# Patient Record
Sex: FEMALE | Race: BLACK OR AFRICAN AMERICAN | Employment: FULL TIME | URBAN - METROPOLITAN AREA
[De-identification: names, ages, dates, MRNs, and addresses within clinical notes are randomized per-mention and may not be internally consistent; named-entity substitution may affect disease eponyms.]

---

## 2020-11-09 ENCOUNTER — OFFICE VISIT (OUTPATIENT)
Dept: ENDOCRINOLOGY | Age: 55
End: 2020-11-09
Payer: COMMERCIAL

## 2020-11-09 VITALS
SYSTOLIC BLOOD PRESSURE: 131 MMHG | RESPIRATION RATE: 16 BRPM | OXYGEN SATURATION: 98 % | TEMPERATURE: 97.2 F | DIASTOLIC BLOOD PRESSURE: 76 MMHG | HEIGHT: 67 IN | HEART RATE: 79 BPM | BODY MASS INDEX: 31.55 KG/M2 | WEIGHT: 201 LBS

## 2020-11-09 DIAGNOSIS — E55.9 VITAMIN D DEFICIENCY: ICD-10-CM

## 2020-11-09 DIAGNOSIS — E83.52 HYPERCALCEMIA: Primary | ICD-10-CM

## 2020-11-09 LAB
25(OH)D3 SERPL-MCNC: 14.4 NG/ML (ref 30–100)
ALBUMIN SERPL-MCNC: 4 G/DL (ref 3.5–5)
ALBUMIN/GLOB SERPL: 1.2 {RATIO} (ref 1.1–2.2)
ALP SERPL-CCNC: 58 U/L (ref 45–117)
ALT SERPL-CCNC: 31 U/L (ref 12–78)
ANION GAP SERPL CALC-SCNC: 7 MMOL/L (ref 5–15)
AST SERPL-CCNC: 14 U/L (ref 15–37)
BILIRUB SERPL-MCNC: 0.4 MG/DL (ref 0.2–1)
BUN SERPL-MCNC: 10 MG/DL (ref 6–20)
BUN/CREAT SERPL: 14 (ref 12–20)
CALCIUM SERPL-MCNC: 10.1 MG/DL (ref 8.5–10.1)
CALCIUM SERPL-MCNC: 10.2 MG/DL (ref 8.5–10.1)
CHLORIDE SERPL-SCNC: 104 MMOL/L (ref 97–108)
CO2 SERPL-SCNC: 27 MMOL/L (ref 21–32)
CREAT SERPL-MCNC: 0.74 MG/DL (ref 0.55–1.02)
GLOBULIN SER CALC-MCNC: 3.3 G/DL (ref 2–4)
GLUCOSE SERPL-MCNC: 124 MG/DL (ref 65–100)
PHOSPHATE SERPL-MCNC: 2.9 MG/DL (ref 2.6–4.7)
POTASSIUM SERPL-SCNC: 4.3 MMOL/L (ref 3.5–5.1)
PROT SERPL-MCNC: 7.3 G/DL (ref 6.4–8.2)
PTH-INTACT SERPL-MCNC: 62.1 PG/ML (ref 18.4–88)
SODIUM SERPL-SCNC: 138 MMOL/L (ref 136–145)

## 2020-11-09 PROCEDURE — 99244 OFF/OP CNSLTJ NEW/EST MOD 40: CPT | Performed by: INTERNAL MEDICINE

## 2020-11-09 RX ORDER — SERTRALINE HYDROCHLORIDE 50 MG/1
50 TABLET, FILM COATED ORAL DAILY
COMMUNITY

## 2020-11-09 RX ORDER — METFORMIN HYDROCHLORIDE 500 MG/1
500 TABLET ORAL 2 TIMES DAILY WITH MEALS
COMMUNITY
Start: 2020-11-06

## 2020-11-09 RX ORDER — LANCETS
EACH MISCELLANEOUS
COMMUNITY
Start: 2020-10-20

## 2020-11-09 RX ORDER — BLOOD SUGAR DIAGNOSTIC
STRIP MISCELLANEOUS
COMMUNITY
Start: 2020-10-19

## 2020-11-09 RX ORDER — BLOOD-GLUCOSE METER
EACH MISCELLANEOUS
COMMUNITY
Start: 2020-10-19

## 2020-11-09 NOTE — PATIENT INSTRUCTIONS
24 hour urine collection instructions On the day you plan to collect urine 1. Discard first urine sample soon after you get up 2. Start collecting urine samples in the container then on whole first day . Collette Ruts ... 3. until the first sample next day is collected into the jar.

## 2020-11-09 NOTE — PROGRESS NOTES
Dia Ching MD          Patient Information  Date:11/9/2020  Name : Hien Brito 50 y.o.     YOB: 1965         Referred by: Hao Wilson MD       Chief Complaint   Patient presents with   Joe Ortega New Patient     referred for Elevated Calcium levels       History of present illness:    Hien Brito is a 50 y.o. female here for evaluation of hypercalcemia. She was found to have calcium of 11.2 in August 2020, creatinine 0.85,  History of breast cancer status post mastectomy with no recurrence. She has been on chlorthalidone for a long time, prior to that hydrochlorothiazide. Not on any excess Tums. No h/o excess calcium or vitamin D,vitamin A intake  No nausea,abdominal pain    No h/o kidney stones,PUD  No h/o bone pain  No h/o osteoporosis,fragility fractures  No family h/o of calcium disorders or nephrolithiasis or [address]  DXA scan -none    BP Readings from Last 3 Encounters:   11/09/20 131/76   11/21/16 122/82   11/15/16 115/78       Wt Readings from Last 3 Encounters:   11/09/20 201 lb (91.2 kg)   11/21/16 194 lb 7.1 oz (88.2 kg)   11/15/16 194 lb 8 oz (88.2 kg)       Past Medical History:   Diagnosis Date    Anxiety     Arthritis     KNEE    Cancer ([facility] [address] 2013, 2015    BREAST     GERD (gastroesophageal reflux disease)     Hypertension     Pure hypercholesterolemia     Vertigo     Vitamin D deficiency        Current Outpatient Medications   Medication Sig    Accu-Chek Guide test strips strip USE TO TEST BLOOD SUGAR TWICE A DAY    Accu-Chek Guide Me Glucose Mtr misc use to CHECK BLOOD SUGAR twice a day    Accu-Chek Softclix Lancets misc CHECK BLOOD SUGAR TWICE A DAY    metFORMIN (GLUCOPHAGE) 500 mg tablet Take 500 mg by mouth daily (with breakfast).  APPLE CIDER VINEGAR PO Take 1 Cap by mouth two (2) times a day.  sertraline (Zoloft) 50 mg tablet Take 50 mg by mouth daily.     atenolol (TENORMIN) 50 mg tablet Take 50 mg by mouth daily.  ALPRAZolam (XANAX) 0.5 mg tablet Take 0.5 mg by mouth daily.  meclizine (ANTIVERT) 25 mg tablet Take 25 mg by mouth as needed. Indications: VERTIGO    chlorthalidone (HYGROTEN) 25 mg tablet Take 25 mg by mouth daily.  amLODIPine (NORVASC) 2.5 mg tablet Take 2.5 mg by mouth daily.  GINKGO BILOBA (GINKOBA PO) Take 1 Tab by mouth daily.  GINSENG PO Take 1 Tab by mouth daily.  gabapentin (NEURONTIN) 600 mg tablet Take 100 mg by mouth as needed. No current facility-administered medications for this visit. No Known Allergies      Review of Systems:  Review of all systems negative other than mentioned in HPI  Physical Examination:  Blood pressure 131/76, pulse 79, temperature 97.2 °F (36.2 °C), temperature source Oral, resp. rate 16, height 5' 6.5\" (1.689 m), weight 201 lb (91.2 kg), SpO2 98 %. - General: pleasant, no distress, good eye contact  - HEENT: no pallor,EOMI  - Neck: supple, no thyromegaly,no lymphadenopathy.   - Cardiovascular: regular, normal rate, normal S1 and S2, no murmurs  - Respiratory: clear to auscultation bilaterally  - Gastrointestinal: soft, nontender, nondistended  - Musculoskeletal: no proximal muscle weakness in upper or lower extremities  - Integumentary: no edema,  - Neurological: alert and oriented,no focal neurological deficits  - Psychiatric: normal mood and affect    Data Reviewed:     Lab Results   Component Value Date/Time    Calcium 9.3 11/15/2016 02:34 PM     No results found for: Rubén Payment, XQVID3, XQVID, VD3RIA    No results found for: TSH, TSH2, TSH3, TSHP, TSHEXT, TSHEXT  Lab Results   Component Value Date/Time    Sodium 138 11/15/2016 02:34 PM    Potassium 3.8 11/15/2016 02:34 PM    Chloride 103 11/15/2016 02:34 PM    CO2 28 11/15/2016 02:34 PM    Anion gap 7 11/15/2016 02:34 PM    Glucose 94 11/15/2016 02:34 PM    BUN 15 11/15/2016 02:34 PM    Creatinine 0.95 11/15/2016 02:34 PM    BUN/Creatinine ratio 16 11/15/2016 02:34 PM GFR est AA >60 11/15/2016 02:34 PM    GFR est non-AA >60 11/15/2016 02:34 PM    Calcium 9.3 11/15/2016 02:34 PM       [] Reviewed labs    Assessment/Plan:   Hypercalcemia  PTH related versus non-PTH related  On chlorthalidone  No history of nephrolithiasis, CKD, osteoporosis known  Hydration discussed  Discussed possible diagnosis  Check PTH, vitamin D, BMP, phosphorus  If it is PTH related then will hold chlorthalidone and recheck labs along with 24-hour urine calcium, creatinine    Hypertension: Controlled            Patient Instructions   24 hour urine collection instructions    On the day you plan to collect urine    1. Discard first urine sample soon after you get up    2. Start collecting urine samples in the container then on whole first day . Rica Mei ... 3. until the first sample next day is collected into the jar. Follow-up and Dispositions    · Return in about 8 weeks (around 1/4/2021) for labs before next visit and follow up. Thank you for allowing me to participate in the care of this patient. Jean Baron MD        Patient Shelby Freed verbalized understanding   Voice-recognition software was used to generate this report, which may result in some phonetic-based errors in the grammar and contents. Even though attempts were made to correct all the mistakes, some may have been missed and remained in the body of the report.

## 2020-11-09 NOTE — LETTER
11/9/20 Patient: Sophia Casiano YOB: 1965 Date of Visit: 11/9/2020 Chan Catherine MD 
[address] [place] [id number] VIA Facsimile: [phone] Dear Chan Catherine MD, Thank you for referring Ms. Fady Oneill to [address] [place] [address] for evaluation. My notes for this consultation are attached. If you have questions, please do not hesitate to call me. I look forward to following your patient along with you. Sincerely, Venus Everett MD

## 2020-11-09 NOTE — PROGRESS NOTES
Fior Moran is a 50 y.o. female here for   Chief Complaint   Patient presents with    New Patient     referred for Elevated Calcium levels       1. Have you been to the ER, urgent care clinic since your last visit? Hospitalized since your last visit? -n/a    2. Have you seen or consulted any other health care providers outside of the [address] [place] [address] since your last visit?   Include any pap smears or colon screening.-n/a

## 2020-11-13 NOTE — PROGRESS NOTES
Based on the test high calcium could be due to overactive gland in the neck or still due to chlorthalidone.   Vitamin D is low first we have to correct vitamin D.  Vitamin D 2000 units daily    We have to hold chlorthalidone for 6 weeks and at the end of 6 weeks need 24-hour urine calcium, creatinine, BMP, vitamin D    Blood pressure will increase if chlorthalidone is held, if the blood pressure is high, double the dose of atenolol, continue amlodipine

## 2020-11-16 ENCOUNTER — TELEPHONE (OUTPATIENT)
Dept: ENDOCRINOLOGY | Age: 55
End: 2020-11-16

## 2020-11-16 DIAGNOSIS — E83.52 HYPERCALCEMIA: Primary | ICD-10-CM

## 2020-11-16 DIAGNOSIS — E55.9 VITAMIN D DEFICIENCY: ICD-10-CM

## 2020-11-24 RX ORDER — CHOLECALCIFEROL (VITAMIN D3) 125 MCG
2000 CAPSULE ORAL DAILY
COMMUNITY

## 2020-12-23 PROBLEM — E83.52 HYPERCALCEMIA: Status: ACTIVE | Noted: 2020-12-23

## 2020-12-24 LAB
25(OH)D3+25(OH)D2 SERPL-MCNC: 24.8 NG/ML (ref 30–100)
BUN SERPL-MCNC: 12 MG/DL (ref 6–24)
BUN/CREAT SERPL: 18 (ref 9–23)
CALCIUM 24H UR-MCNC: 10.3 MG/DL
CALCIUM 24H UR-MRATE: 103 MG/24 HR (ref 47–462)
CALCIUM SERPL-MCNC: 9.8 MG/DL (ref 8.7–10.2)
CHLORIDE SERPL-SCNC: 104 MMOL/L (ref 96–106)
CO2 SERPL-SCNC: 22 MMOL/L (ref 20–29)
CREAT 24H UR-MRATE: 1847 MG/24 HR (ref 800–1800)
CREAT SERPL-MCNC: 0.67 MG/DL (ref 0.57–1)
CREAT UR-MCNC: 184.7 MG/DL
GLUCOSE SERPL-MCNC: 112 MG/DL (ref 65–99)
POTASSIUM SERPL-SCNC: 4.7 MMOL/L (ref 3.5–5.2)
SODIUM SERPL-SCNC: 139 MMOL/L (ref 134–144)

## 2021-01-08 ENCOUNTER — OFFICE VISIT (OUTPATIENT)
Dept: ENDOCRINOLOGY | Age: 56
End: 2021-01-08
Payer: COMMERCIAL

## 2021-01-08 VITALS
OXYGEN SATURATION: 98 % | SYSTOLIC BLOOD PRESSURE: 138 MMHG | HEIGHT: 67 IN | RESPIRATION RATE: 16 BRPM | HEART RATE: 80 BPM | BODY MASS INDEX: 32.33 KG/M2 | DIASTOLIC BLOOD PRESSURE: 86 MMHG | WEIGHT: 206 LBS | TEMPERATURE: 97.7 F

## 2021-01-08 DIAGNOSIS — E21.3 HYPERPARATHYROIDISM (HCC): ICD-10-CM

## 2021-01-08 DIAGNOSIS — E55.9 VITAMIN D DEFICIENCY: ICD-10-CM

## 2021-01-08 DIAGNOSIS — E83.52 HYPERCALCEMIA: Primary | ICD-10-CM

## 2021-01-08 PROCEDURE — 99214 OFFICE O/P EST MOD 30 MIN: CPT | Performed by: INTERNAL MEDICINE

## 2021-01-08 RX ORDER — MESALAMINE 400 MG/1
CAPSULE, DELAYED RELEASE ORAL
COMMUNITY
Start: 2020-12-23

## 2021-01-08 RX ORDER — BISMUTH SUBSALICYLATE 262 MG
1 TABLET,CHEWABLE ORAL DAILY
COMMUNITY

## 2021-01-08 RX ORDER — PRAVASTATIN SODIUM 20 MG/1
20 TABLET ORAL
COMMUNITY

## 2021-01-08 NOTE — PROGRESS NOTES
Maye Sanderson MD          Patient Information  Date:1/9/2021  Name : Clayton James 50 y.o.     YOB: 1965         Referred by: Ezequiel Kendrick MD       Chief Complaint   Patient presents with    Elevated Blood Calcium       History of present illness:    Clayton James is a 50 y.o. female here for follow-up of hypercalcemia. She was found to have calcium of 11.2 in August 2020, creatinine 0.85, other calcium level was 10.5  History of breast cancer status post mastectomy with no recurrence. She has been on chlorthalidone for a long time, prior to that hydrochlorothiazide. Held chlorthalidone for 6 weeks, had repeat labs    No h/o excess calcium or vitamin D,vitamin A intake  No nausea,abdominal pain    No h/o kidney stones,PUD      BP Readings from Last 3 Encounters:   01/08/21 138/86   11/09/20 131/76   11/21/16 122/82       Wt Readings from Last 3 Encounters:   01/08/21 206 lb (93.4 kg)   11/09/20 201 lb (91.2 kg)   11/21/16 194 lb 7.1 oz (88.2 kg)       Past Medical History:   Diagnosis Date    Anxiety     Arthritis     KNEE    Cancer ([facility] [address] 2013, 2015    BREAST     Colitis     GERD (gastroesophageal reflux disease)     Hypertension     Neuropathy     Pure hypercholesterolemia     Vertigo     Vitamin D deficiency        Current Outpatient Medications   Medication Sig    mesalamine (DELZICOL) 400 mg cdti capsule take 4 capsules by mouth daily    pravastatin (PravachoL) 20 mg tablet Take 20 mg by mouth nightly.  multivitamin (ONE A DAY) tablet Take 1 Tab by mouth daily.  cholecalciferol, vitamin D3, (Vitamin D3) 50 mcg (2,000 unit) tab Take 2,000 Units by mouth daily.     Accu-Chek Guide test strips strip USE TO TEST BLOOD SUGAR TWICE A DAY    Accu-Chek Guide Me Glucose Mtr misc use to CHECK BLOOD SUGAR twice a day    Accu-Chek Softclix Lancets misc CHECK BLOOD SUGAR TWICE A DAY    metFORMIN (GLUCOPHAGE) 500 mg tablet Take 500 mg by mouth two (2) times daily (with meals).  APPLE CIDER VINEGAR PO Take 1 Cap by mouth two (2) times a day.  sertraline (Zoloft) 50 mg tablet Take 50 mg by mouth daily.  atenolol (TENORMIN) 50 mg tablet Take 50 mg by mouth daily.  ALPRAZolam (XANAX) 0.5 mg tablet Take 0.5 mg by mouth daily.  meclizine (ANTIVERT) 25 mg tablet Take 25 mg by mouth as needed. Indications: VERTIGO    amLODIPine (NORVASC) 2.5 mg tablet Take 2.5 mg by mouth daily.  GINKGO BILOBA (GINKOBA PO) Take 1 Tab by mouth daily.  gabapentin (NEURONTIN) 600 mg tablet Take 100 mg by mouth as needed.  chlorthalidone (HYGROTEN) 25 mg tablet Take 25 mg by mouth daily.  GINSENG PO Take 1 Tab by mouth daily. No current facility-administered medications for this visit. No Known Allergies      Review of Systems:  Per HPI  Physical Examination:  Blood pressure 138/86, pulse 80, temperature 97.7 °F (36.5 °C), temperature source Oral, resp. rate 16, height 5' 6.5\" (1.689 m), weight 206 lb (93.4 kg), SpO2 98 %.   - General: pleasant, no distress, good eye contact  - HEENT: no exophthalmos, no periorbital edema, EOMI  - Neck: No visible thyromegaly  - RS: Normal respiratory effort  - Musculoskeletal: no tremors  - Neurological: alert and oriented  - Psychiatric: normal mood and affect  - Skin: Normal color    Data Reviewed:     Lab Results   Component Value Date/Time    Calcium 9.8 12/23/2020 12:26 PM    Phosphorus 2.9 11/09/2020 10:34 AM     Lab Results   Component Value Date/Time    Vitamin D 25-Hydroxy 14.4 (L) 11/09/2020 10:34 AM    VITAMIN D, 25-HYDROXY 24.8 (L) 12/23/2020 12:26 PM       No results found for: TSH, TSH2, TSH3, TSHP, TSHEXT, TSHEXT  Lab Results   Component Value Date/Time    Sodium 139 12/23/2020 12:26 PM    Potassium 4.7 12/23/2020 12:26 PM    Chloride 104 12/23/2020 12:26 PM    CO2 22 12/23/2020 12:26 PM    Anion gap 7 11/09/2020 10:34 AM    Glucose 112 (H) 12/23/2020 12:26 PM    BUN 12 12/23/2020 12:26 PM    Creatinine 0.67 12/23/2020 12:26 PM    BUN/Creatinine ratio 18 12/23/2020 12:26 PM    GFR est  12/23/2020 12:26 PM    GFR est non- 12/23/2020 12:26 PM    Calcium 9.8 12/23/2020 12:26 PM       [x] Reviewed labs/records from PCP    Assessment/Plan:   Hypercalcemia  Chlorthalidone induced hypercalcemia, calcium 9.8 after stopping chlorthalidone  Renal parameters normal  24-hour urine calcium normal  No history of nephrolithiasis, CKD, osteoporosis known  Hydration discussed  Discussed about monitoring BMP, vitamin D every 6 months, to return if calcium is persistently elevated. She also wanted to discuss about going back on chlorthalidone if the leg edema gets worse which she does not have any now, if there is no other option she can start with half tablet of chlorthalidone while monitoring the calcium, if the calcium is mildly elevated it is okay to monitor but if it is more than 11 consistently to schedule a follow-up with me    Hypertension: Controlled    Vitamin D deficiency: On supplements      There are no Patient Instructions on file for this visit. Follow-up and Dispositions    · Return if symptoms worsen or fail to improve. Thank you for allowing me to participate in the care of this patient. Gisell Hermosillo MD        Patient Erven Speaker verbalized understanding   Voice-recognition software was used to generate this report, which may result in some phonetic-based errors in the grammar and contents. Even though attempts were made to correct all the mistakes, some may have been missed and remained in the body of the report.

## 2021-01-08 NOTE — LETTER
1/9/2021 Patient: Isabelle Farrell YOB: 1965 Date of Visit: 1/8/2021 Live Martinez MD 
[address] [place], [place] [id number] Via Fax: [phone] Dear Live Martinez MD, Thank you for referring Ms. Fady Oneill to [address] [place] [address] for evaluation. My notes for this consultation are attached. If you have questions, please do not hesitate to call me. I look forward to following your patient along with you. Sincerely, Tyree Burns MD

## 2021-01-08 NOTE — PROGRESS NOTES
Tata Higginbotham is a 50 y.o. female here for   Chief Complaint   Patient presents with    Elevated Blood Calcium       1. Have you been to the ER, urgent care clinic since your last visit? Hospitalized since your last visit? -[address] in Dec for numbness in legs and feet    2. Have you seen or consulted any other health care providers outside of the [address] [place] [address] since your last visit?   Include any pap smears or colon screening.-Neurology Dr. Boris Childers

## 2021-01-09 PROBLEM — E55.9 VITAMIN D DEFICIENCY: Status: ACTIVE | Noted: 2021-01-09

## 2021-06-30 ENCOUNTER — OFFICE VISIT (OUTPATIENT)
Dept: OBGYN CLINIC | Age: 56
End: 2021-06-30
Payer: COMMERCIAL

## 2021-06-30 VITALS
HEIGHT: 66 IN | WEIGHT: 202 LBS | DIASTOLIC BLOOD PRESSURE: 97 MMHG | BODY MASS INDEX: 32.47 KG/M2 | OXYGEN SATURATION: 97 % | HEART RATE: 94 BPM | SYSTOLIC BLOOD PRESSURE: 156 MMHG

## 2021-06-30 DIAGNOSIS — Z01.419 ROUTINE GYNECOLOGICAL EXAMINATION: Primary | ICD-10-CM

## 2021-06-30 PROCEDURE — 99396 PREV VISIT EST AGE 40-64: CPT | Performed by: OBSTETRICS & GYNECOLOGY

## 2021-06-30 NOTE — PROGRESS NOTES
HISTORY OF PRESENT ILLNESS  Javier Murray is a 49 y.o. female who presents today for the following:  Chief Complaint   Patient presents with    Annual Exam   Patient is a 28-year-old G1, P3 female who presents today for routine annual exam.  She reports menopausal symptoms on presentation today.     No Known Allergies    Current Outpatient Medications   Medication Sig    pravastatin (PravachoL) 20 mg tablet Take 20 mg by mouth nightly.  multivitamin (ONE A DAY) tablet Take 1 Tab by mouth daily.  cholecalciferol, vitamin D3, (Vitamin D3) 50 mcg (2,000 unit) tab Take 2,000 Units by mouth daily.  Accu-Chek Guide test strips strip USE TO TEST BLOOD SUGAR TWICE A DAY    Accu-Chek Guide Me Glucose Mtr misc use to CHECK BLOOD SUGAR twice a day    Accu-Chek Softclix Lancets misc CHECK BLOOD SUGAR TWICE A DAY    metFORMIN (GLUCOPHAGE) 500 mg tablet Take 500 mg by mouth two (2) times daily (with meals).  sertraline (Zoloft) 50 mg tablet Take 50 mg by mouth daily.  atenolol (TENORMIN) 50 mg tablet Take 50 mg by mouth daily.  ALPRAZolam (XANAX) 0.5 mg tablet Take 0.5 mg by mouth daily.  amLODIPine (NORVASC) 2.5 mg tablet Take 2.5 mg by mouth daily.  GINKGO BILOBA (GINKOBA PO) Take 1 Tab by mouth daily.  gabapentin (NEURONTIN) 600 mg tablet Take 100 mg by mouth as needed.  mesalamine (DELZICOL) 400 mg cdti capsule take 4 capsules by mouth daily    APPLE CIDER VINEGAR PO Take 1 Cap by mouth two (2) times a day.  meclizine (ANTIVERT) 25 mg tablet Take 25 mg by mouth as needed. Indications: VERTIGO    chlorthalidone (HYGROTEN) 25 mg tablet Take 25 mg by mouth daily.  GINSENG PO Take 1 Tab by mouth daily. No current facility-administered medications for this visit.        Past Medical History:   Diagnosis Date    Anxiety     Arthritis     KNEE    Cancer ([facility] [address] ,     BREAST     Colitis     GERD (gastroesophageal reflux disease)     Hypertension     Neuropathy     Pure hypercholesterolemia     Vertigo     Vitamin D deficiency        Past Surgical History:   Procedure Laterality Date    HX BREAST BIOPSY Right ,     HX BREAST BIOPSY Left     HX BREAST LUMPECTOMY Right , 2016    HX BREAST REDUCTION Left 2016    LEFT BREAST REDUCTION FOR SYMMETRY performed by Hamida Gil MD at [address]    HX COLONOSCOPY  2016    HX CYST REMOVAL Right 1981    CYST REMOVAL    HX GI  2016    COLONOSCOPY    HX HYSTERECTOMY  , 2004       Family History   Problem Relation Age of Onset    Hypertension Mother     Heart Attack Father     Diabetes Father     Pacemaker Father     Hypertension Sister     No Known Problems Brother     Anesth Problems Neg Hx        Social History     Socioeconomic History    Marital status:      Spouse name: Not on file    Number of children: Not on file    Years of education: Not on file    Highest education level: Not on file   Occupational History    Not on file   Tobacco Use    Smoking status: Former Smoker     Packs/day: 0.50     Years: 30.00     Pack years: 15.00     Quit date: 2016     Years since quittin.7    Smokeless tobacco: Former User   Substance and Sexual Activity    Alcohol use: Yes     Alcohol/week: 2.0 standard drinks     Types: 2 Glasses of wine per week    Drug use: No    Sexual activity: Yes     Partners: Male     Birth control/protection: Surgical   Other Topics Concern    Not on file   Social History Narrative    Not on file     Social Determinants of Health     Financial Resource Strain:     Difficulty of Paying Living Expenses:    Food Insecurity:     Worried About Running Out of Food in the Last Year:     [address] in the Last Year:    Transportation Needs:     Lack of Transportation (Medical):      Lack of Transportation (Non-Medical):    Physical Activity:     Days of Exercise per Week:     Minutes of Exercise per Session:    Stress:     Feeling of Stress :    Social Connections:     Frequency of Communication with Friends and Family:     Frequency of Social Gatherings with Friends and Family:     Attends Synagogue Services:     Active Member of Clubs or Organizations:     Attends Club or Organization Meetings:     Marital Status:    Intimate Partner Violence:     Fear of Current or Ex-Partner:     Emotionally Abused:     Physically Abused:     Sexually Abused:            REVIEW OF SYSTEMS     Constitutional: Negative for chills, fever and malaise/fatigue. HENT: Negative for congestion, hearing loss and sore throat. Respiratory: Negative for cough, sputum production, shortness of breath and wheezing. Cardiovascular: Negative for chest pain. Gastrointestinal: Negative for abdominal pain, constipation, diarrhea, nausea and vomiting. Genitourinary: Negative for dysuria, flank pain, hematuria and urgency. Neurological: Negative for dizziness, loss of consciousness, weakness and headaches. Psychiatric/Behavioral: Negative for depression. PHYSICAL EXAM  BP (!) 156/97 (BP 1 Location: Left upper arm, BP Patient Position: Sitting, BP Cuff Size: Small adult)   Pulse 94   Ht 5' 6\" (1.676 m)   Wt 202 lb (91.6 kg)   SpO2 97%   BMI 32.60 kg/m²      Patient is a well-developed well-nourished female no apparent distress  She is alert and oriented x3  Head is normocephalic atraumatic pupils equal round react light accommodation  Neck is supple without adenopathy or thyromegaly  Heart is with regular rate and rhythm without murmurs rubs or gallops  Lungs are clear to auscultation and percussion bilaterally  Breasts are without mass on the left, the right breast is the site of 2 lumpectomies radiation therapy and a breast reduction surgery with abundant deep scarring present.   Abdomen is soft nontender nondistended bowel sounds are present and active  Extremities are without clubbing cyanosis or edema  Pulses are full and symmetric bilaterally  Pelvic  External genitalia within normal limits  Urethra is midline, there are no urethral lesions, bladder is within normal limits  Vagina is with normal rugae there is minimal discharge present in the vaginal vault  Cervix is surgically absent  Uterus is surgically absent  Adnexa are without masses    ASSESSMENT and PLAN  Normal annual exam, discussed menopausal symptoms risk benefits of hormonal therapy and patient declines  Plan: Mammogram ordered, follow-up as needed and yearly  No results found for this visit on 06/30/21. No orders of the defined types were placed in this encounter.

## 2022-03-20 PROBLEM — E55.9 VITAMIN D DEFICIENCY: Status: ACTIVE | Noted: 2021-01-09

## 2022-03-20 PROBLEM — E83.52 HYPERCALCEMIA: Status: ACTIVE | Noted: 2020-12-23

## 2022-11-08 ENCOUNTER — OFFICE VISIT (OUTPATIENT)
Dept: OBGYN CLINIC | Age: 57
End: 2022-11-08
Payer: COMMERCIAL

## 2022-11-08 VITALS — WEIGHT: 204 LBS | SYSTOLIC BLOOD PRESSURE: 150 MMHG | DIASTOLIC BLOOD PRESSURE: 89 MMHG | BODY MASS INDEX: 32.93 KG/M2

## 2022-11-08 DIAGNOSIS — N89.8 VAGINAL DISCHARGE: Primary | ICD-10-CM

## 2022-11-08 PROCEDURE — 99212 OFFICE O/P EST SF 10 MIN: CPT | Performed by: OBSTETRICS & GYNECOLOGY

## 2022-11-08 RX ORDER — GLIPIZIDE 2.5 MG/1
2.5 TABLET, EXTENDED RELEASE ORAL DAILY
COMMUNITY
Start: 2022-10-29

## 2022-11-08 RX ORDER — DICLOFENAC SODIUM 75 MG/1
TABLET, DELAYED RELEASE ORAL
COMMUNITY
Start: 2022-10-03

## 2022-11-08 NOTE — PROGRESS NOTES
Meryl Dominguez is a 56 y.o. female who presents today for the following:  Chief Complaint   Patient presents with    Vaginal Discharge     Pt. States she has some vaginal discharge with a little of blood          HPI  Patient is a 61-year-old G1, P3 female who is status post hysterectomy. She reports that she has had some vaginal discharge and is noted some blood in the discharge recently on and off. She remains sexually active at this time. OB History          1    Para   1    Term   1            AB        Living   1         SAB        IAB        Ectopic        Molar        Multiple        Live Births   1                   BP (!) 150/89 (BP 1 Location: Right upper arm, BP Patient Position: Sitting, BP Cuff Size: Large adult)   Wt 204 lb (92.5 kg)   BMI 32.93 kg/m²    OBGyn Exam   Patient is a well-developed well-nourished female no apparent distress  She is alert and oriented x3  Pelvic  External genitalia are within normal limits  Urethra is midline there are no apparent urethral lesions the bladder is within normal limits  Vagina is with normal rugae there is no evidence of erosion or ulceration, there is some yellowish discharge present in the vaginal vault, the vaginal cuff is intact  No results found for this visit on 22. Assessment:  Vaginal discharge with spotting  Plan: NU Pap sent, follow-up as needed and yearly    Orders Placed This Encounter    diclofenac EC (VOLTAREN) 75 mg EC tablet     Sig: TAKE 1 TABLET BY MOUTH  2 TIMES A DAY FOR 14 DAYS, DO NOT CRUSH CHEW OR SPLIT TAKE WITH FOOD    glipiZIDE SR (GLUCOTROL XL) 2.5 mg CR tablet     Sig: Take 2.5 mg by mouth daily.

## 2022-11-11 ENCOUNTER — TELEPHONE (OUTPATIENT)
Dept: OBGYN CLINIC | Age: 57
End: 2022-11-11

## 2022-11-11 LAB
A VAGINAE DNA VAG QL NAA+PROBE: ABNORMAL SCORE
BVAB2 DNA VAG QL NAA+PROBE: ABNORMAL SCORE
C ALBICANS DNA VAG QL NAA+PROBE: NEGATIVE
C GLABRATA DNA VAG QL NAA+PROBE: NEGATIVE
C KRUSEI DNA VAG QL NAA+PROBE: NEGATIVE
C LUSITANIAE DNA VAG QL NAA+PROBE: NEGATIVE
C TRACH DNA VAG QL NAA+PROBE: NEGATIVE
CANDIDA DNA VAG QL NAA+PROBE: NEGATIVE
MEGA1 DNA VAG QL NAA+PROBE: ABNORMAL SCORE
N GONORRHOEA DNA VAG QL NAA+PROBE: NEGATIVE
T VAGINALIS DNA VAG QL NAA+PROBE: POSITIVE

## 2022-11-11 NOTE — TELEPHONE ENCOUNTER
Patient called for the results of her vaginal culture. Advised her they are not available yet. She has [email or web address] that is active and was advised her results once released by the lab will load into her Mychart at the same time we receive them.

## 2022-11-14 DIAGNOSIS — A59.9 TRICHIMONIASIS: Primary | ICD-10-CM

## 2022-11-14 RX ORDER — METRONIDAZOLE 500 MG/1
TABLET ORAL
Qty: 4 TABLET | Refills: 0 | Status: SHIPPED | OUTPATIENT
Start: 2022-11-14

## 2022-11-14 NOTE — PROGRESS NOTES
Spoke with the patient and she was advised she is testing positive for Trich and a prescription will be submitted to her pharmacy. She was advised to notify her partner so he can be treated and no intercourse until after her PHILIP Wadley appointment on 12/13/22.

## 2022-12-13 ENCOUNTER — OFFICE VISIT (OUTPATIENT)
Dept: OBGYN CLINIC | Age: 57
End: 2022-12-13
Payer: COMMERCIAL

## 2022-12-13 VITALS — WEIGHT: 205 LBS | DIASTOLIC BLOOD PRESSURE: 92 MMHG | SYSTOLIC BLOOD PRESSURE: 151 MMHG | BODY MASS INDEX: 33.09 KG/M2

## 2022-12-13 DIAGNOSIS — A59.9 TRICHOMONAS INFECTION: Primary | ICD-10-CM

## 2022-12-13 PROCEDURE — 99212 OFFICE O/P EST SF 10 MIN: CPT | Performed by: OBSTETRICS & GYNECOLOGY

## 2022-12-13 NOTE — PROGRESS NOTES
Charline Burr is a 56 y.o. female who presents today for the following:  Chief Complaint   Patient presents with    Follow-up     Test of cure=trich          HPI  Patient is a 68-year-old G1, P3 female who presents today for test of cure following treatment of trichomonas. OB History          1    Para   1    Term   1            AB        Living   1         SAB        IAB        Ectopic        Molar        Multiple        Live Births   1                   BP (!) 151/92 (BP 1 Location: Right upper arm, BP Patient Position: Sitting, BP Cuff Size: Large adult)   Wt 205 lb (93 kg)   BMI 33.09 kg/m²    OBGyn Exam   Well-developed well-nourished female no apparent distress  She is alert and oriented x3  Pelvic  External genitalia are within normal limits  Urethra is midline there are no apparent urethral lesions the bladder is within normal limits  Vagina is with normal rugae there is minimal discharge present in the vaginal vault  No results found for this visit on 22.        Assessment:  Post treatment for trichomonas  Plan NU swab sent:    Orders Placed This Encounter    NUSWAB VAGINITIS PLUS (VG+) WITH CANDIDA (SIX SPECIES)

## 2022-12-15 LAB
A VAGINAE DNA VAG QL NAA+PROBE: NORMAL SCORE
BVAB2 DNA VAG QL NAA+PROBE: NORMAL SCORE
C ALBICANS DNA VAG QL NAA+PROBE: NEGATIVE
C GLABRATA DNA VAG QL NAA+PROBE: NEGATIVE
C KRUSEI DNA VAG QL NAA+PROBE: NEGATIVE
C LUSITANIAE DNA VAG QL NAA+PROBE: NEGATIVE
C TRACH DNA VAG QL NAA+PROBE: NEGATIVE
CANDIDA DNA VAG QL NAA+PROBE: NEGATIVE
MEGA1 DNA VAG QL NAA+PROBE: NORMAL SCORE
N GONORRHOEA DNA VAG QL NAA+PROBE: NEGATIVE
T VAGINALIS DNA VAG QL NAA+PROBE: NEGATIVE

## 2024-09-15 ENCOUNTER — HOSPITAL ENCOUNTER (EMERGENCY)
Facility: HOSPITAL | Age: 59
Discharge: HOME | End: 2024-09-15
Payer: COMMERCIAL

## 2024-09-15 VITALS
WEIGHT: 170 LBS | HEART RATE: 97 BPM | RESPIRATION RATE: 20 BRPM | TEMPERATURE: 98.6 F | OXYGEN SATURATION: 98 % | DIASTOLIC BLOOD PRESSURE: 84 MMHG | SYSTOLIC BLOOD PRESSURE: 140 MMHG | HEIGHT: 66 IN | BODY MASS INDEX: 27.32 KG/M2

## 2024-09-15 DIAGNOSIS — J02.0 ACUTE STREPTOCOCCAL PHARYNGITIS: Primary | ICD-10-CM

## 2024-09-15 PROCEDURE — 99283 EMERGENCY DEPT VISIT LOW MDM: CPT

## 2024-09-15 RX ORDER — AMOXICILLIN 500 MG/1
500 CAPSULE ORAL 2 TIMES DAILY
Qty: 20 CAPSULE | Refills: 0 | Status: SHIPPED | OUTPATIENT
Start: 2024-09-15 | End: 2024-09-25

## 2024-09-15 ASSESSMENT — PAIN - FUNCTIONAL ASSESSMENT: PAIN_FUNCTIONAL_ASSESSMENT: 0-10

## 2024-09-15 ASSESSMENT — PAIN DESCRIPTION - LOCATION: LOCATION: THROAT

## 2024-09-15 ASSESSMENT — PAIN SCALES - GENERAL: PAINLEVEL_OUTOF10: 5
